# Patient Record
Sex: MALE | Race: BLACK OR AFRICAN AMERICAN | NOT HISPANIC OR LATINO | Employment: UNEMPLOYED | URBAN - METROPOLITAN AREA
[De-identification: names, ages, dates, MRNs, and addresses within clinical notes are randomized per-mention and may not be internally consistent; named-entity substitution may affect disease eponyms.]

---

## 2024-02-04 ENCOUNTER — HOSPITAL ENCOUNTER (EMERGENCY)
Facility: CLINIC | Age: 19
Discharge: HOME | End: 2024-02-04
Attending: EMERGENCY MEDICINE | Admitting: EMERGENCY MEDICINE
Payer: COMMERCIAL

## 2024-02-04 ENCOUNTER — APPOINTMENT (OUTPATIENT)
Dept: GENERAL RADIOLOGY | Facility: CLINIC | Age: 19
End: 2024-02-04
Attending: EMERGENCY MEDICINE
Payer: COMMERCIAL

## 2024-02-04 VITALS
RESPIRATION RATE: 20 BRPM | TEMPERATURE: 97.8 F | OXYGEN SATURATION: 95 % | SYSTOLIC BLOOD PRESSURE: 153 MMHG | DIASTOLIC BLOOD PRESSURE: 68 MMHG | HEART RATE: 72 BPM

## 2024-02-04 DIAGNOSIS — S93.401A SPRAIN OF RIGHT ANKLE, UNSPECIFIED LIGAMENT, INITIAL ENCOUNTER: ICD-10-CM

## 2024-02-04 PROCEDURE — 250N000013 HC RX MED GY IP 250 OP 250 PS 637: Performed by: EMERGENCY MEDICINE

## 2024-02-04 PROCEDURE — 99283 EMERGENCY DEPT VISIT LOW MDM: CPT

## 2024-02-04 PROCEDURE — 73610 X-RAY EXAM OF ANKLE: CPT | Mod: RT

## 2024-02-04 RX ORDER — IBUPROFEN 600 MG/1
600 TABLET, FILM COATED ORAL ONCE
Status: COMPLETED | OUTPATIENT
Start: 2024-02-04 | End: 2024-02-04

## 2024-02-04 RX ADMIN — IBUPROFEN 600 MG: 600 TABLET, FILM COATED ORAL at 01:20

## 2024-02-04 NOTE — DISCHARGE INSTRUCTIONS
Take ibuprofen 600 mg 3x per day as needed.  This will provide both pain control and fight against inflammation.    Discharge Instructions  Ankle Sprain    An ankle sprain is a stretching or tearing of a ligament around your ankle joint. In most cases, we recommend resting the ankle for about 3 days, followed by return to activity. Some severe sprains need longer periods of rest, or can require a cast or boot to immobilize them.    Generally, every Emergency Department visit should have a follow-up clinic visit with either a primary or a specialty clinic/provider. Please follow-up as instructed by your emergency provider today.    Return to the Emergency Department if:  Your pain is much worse, or if there is pain in a new area.  Your foot or leg becomes pale, cool, blue, or numb or tingling.  There is anything concerning to you about how your ankle looks.  Any splint or device is feeling too tight, causing pain, or rubbing into your skin.    Follow-up with your provider:  As recommended by your emergency provider.  If your ankle is not back to normal within about 1 week.  If you are involved in significant athletic activities.        Treatment:  Apply ice your injured area for 15 minutes at a time, at least 3 times a day for the first 1-2 days. Use a cloth between the ice bag and your skin to prevent frostbite.   Do not sleep with an ice pack or heating pad on, since this can cause burns or skin injury.  Raise the injured area above the level of your heart as much as possible in the first 1-2 days.  Pain medications -- You may take a pain medication such as Tylenol  (acetaminophen), Advil , Nuprin  (ibuprofen) or Aleve  (naproxen).  Splint. We often give a stirrup-shaped ankle splint to support your ankle and prevent it from turning again. Wear this all the time for the first 3-5 days, and then as directed by your provider.  Crutches. If you cannot put weight on the ankle without a lot of pain, we recommend  crutches. You can put as much weight on the ankle as possible without severe pain.   Compression. An elastic bandage (Ace  wrap) can help with pain and swelling. Remove this at least twice a day, and leave it off for several hours if you develop swelling of the foot.   Exercises.  Movements, like rotating the foot in circles, should be started when swelling improves.   If you were given a prescription for medicine here today, be sure to read all of the information (including the package insert) that comes with your prescription.  This will include important information about the medicine, its side effects, and any warnings that you need to know about.  The pharmacist who fills the prescription can provide more information and answer questions you may have about the medicine.  If you have questions or concerns that the pharmacist cannot address, please call or return to the Emergency Department.  Remember that you can always come back to the Emergency Department if you are not able to see your regular provider in the amount of time listed above, if you get any new symptoms, or if there is anything that worries you.

## 2024-02-04 NOTE — ED PROVIDER NOTES
History     Chief Complaint:  Ankle Pain       HPI   Debbie Reich is a 19 year old male who presents with ankle pain. Patient rolled his right ankle at 2000 yesterday while playing basketball.  Since the injury he has been having trouble putting weight on it. He denies any knee pain. Pain and swelling noted to the lateral ankle.    Review of External notes      Medications:    The patient is not taking any routine medications     Past Medical History:    None     Past Surgical History:    None      Physical Exam   Patient Vitals for the past 24 hrs:   BP Temp Temp src Pulse Resp SpO2   02/04/24 0106 (!) 162/76 97.8  F (36.6  C) Temporal 88 20 98 %        Physical Exam  Right Lower Extremity:  swelling at the ankle, no foot swelling, + tenderness over the ATFL, + tenderness over the CFL, no tenderness over the PTFL, no tenderness of the deltoid ligament, tenderness over the distal 6cm of the lateral malleolus, no tenderness over the distal 6cm of the medial malleolus, no tenderness at the base of the 5th metatarsal, no tenderness of the midfoot, no tenderness over the achilles tendon, no palpable achilles tendon defect     Right Knee: No tenderness, full AROM  Skin: no rash or redness RLE  Neuro: sensation intact over the dorsal and plantar surface of the foot  CV: 2+ DP and PT pulses present in Right lower extremity    Emergency Department Course   ECG:  No results found for this or any previous visit.    Imaging:  Ankle XR, G/E 3 views, right    (Results Pending)        Laboratory:  Labs Ordered and Resulted from Time of ED Arrival to Time of ED Departure - No data to display     ED Course as of 02/04/24 0140   Sun Feb 04, 2024   0112 I examined the patient and obtained history as noted above    0132 Independent or potation right ankle x-ray-no fracture     Interventions:  Medications   ibuprofen (ADVIL/MOTRIN) tablet 600 mg (has no administration in time range)        Impression & Plan    Independent  Interpretation:    See ED course    Medical Decision Making:  Debbie Reich is a 19 year old male who presents for evaluation of ankle pain.  Signs and symptoms are consistent with an ankle sprain.  This involves 2/3 ligaments of the lateral ankle by clinical exam. No signs of septic arthritis, gout, pseudogout, fracture, cellulitis, etc.  There are no signs of fracture.  The patients neurovascular status is normal. Plan is for protected weightbearing with ace wrap and gel splint.  Ice and Ibuprofen prn for pain.  Patient will advance weightbearing as tolerated and follow-up with sports med. They will begin gentle ROM exercises of the ankle including PF,DF, alphabet exercises.       Disposition:  Discharge    Diagnosis:    ICD-10-CM    1. Sprain of right ankle, unspecified ligament, initial encounter  S93.401A Orthopedic  Referral           Discharge Medications:  New Prescriptions    No medications on file      MD Lisa Arguelles Kristi Jo Schneider, MD  02/04/24 0142

## 2024-04-01 ENCOUNTER — TRANSCRIBE ORDERS (OUTPATIENT)
Dept: OTHER | Age: 19
End: 2024-04-01

## 2024-04-01 DIAGNOSIS — L70.0 ACNE VULGARIS: Primary | ICD-10-CM

## 2024-10-01 ENCOUNTER — OFFICE VISIT (OUTPATIENT)
Dept: DERMATOLOGY | Facility: CLINIC | Age: 19
End: 2024-10-01
Attending: PEDIATRICS
Payer: COMMERCIAL

## 2024-10-01 DIAGNOSIS — L70.0 ACNE VULGARIS: Primary | ICD-10-CM

## 2024-10-01 PROCEDURE — 99203 OFFICE O/P NEW LOW 30 MIN: CPT | Mod: GC | Performed by: DERMATOLOGY

## 2024-10-01 RX ORDER — DOXYCYCLINE 100 MG/1
100 CAPSULE ORAL 2 TIMES DAILY
Qty: 60 CAPSULE | Refills: 5 | Status: SHIPPED | OUTPATIENT
Start: 2024-10-01

## 2024-10-01 RX ORDER — CLINDAMYCIN PHOSPHATE 10 UG/ML
LOTION TOPICAL 2 TIMES DAILY
Qty: 60 ML | Refills: 3 | Status: SHIPPED | OUTPATIENT
Start: 2024-10-01

## 2024-10-01 RX ORDER — DOXYCYCLINE HYCLATE 50 MG/1
50 CAPSULE ORAL 2 TIMES DAILY
COMMUNITY

## 2024-10-01 RX ORDER — TRETINOIN 0.25 MG/G
CREAM TOPICAL AT BEDTIME
Qty: 45 G | Refills: 3 | Status: SHIPPED | OUTPATIENT
Start: 2024-10-01

## 2024-10-01 ASSESSMENT — PAIN SCALES - GENERAL: PAINLEVEL: NO PAIN (0)

## 2024-10-01 NOTE — PROGRESS NOTES
[facility] Dermatology Note  Encounter Date: Oct 1, 2024  Office Visit     Dermatology Problem List:  1. Acne vulgaris  - Few comedones to many inflamed papules, pustules, and nodules on face  - Current tx: Adapalene and Doxy 50 mg BID    ____________________________________________    Assessment & Plan:     # Acne vulgaris, moderate to severe.  -Patient's skin care routine should be reviewed. Discussed using gentle cleanser. Discouraged use of scrubs, brushes, astringents, and other potentially abrasive cleansing products. Encourages sunscreen of 30 spf maureen while being treated.  Reiterated consistent, regular care over months to see improvements.  - Start doxycycline 100 mg twice daily  - Start tretinoin 0.25% nightly 3x's/weekly  - BPO wash daily   - Apply clindamycin 1% lotion daily       Procedures Performed:   None    Follow-up: 4 month(s) in-person with PA, or earlier for new or changing lesions    Staff and Resident:     Dr. Cb Daniels  PGY2, Dermatology resident   I, Lis Vivar MD, saw this patient with the resident and agree with the resident s findings and plan of care as documented in the resident s note.    ____________________________________________    CC: Acne (Here today for acne concerns. )    HPI:  Mr. Debbie Reich is a(n) 19 year old male who presents today as a new patient for acne. Patient notices that the acne comes and goes. He has acne on his face and on his back. Treatment hs has had in the past in Doxycycline and took it only during flares as instructed by his PCP. Reports the longest time that he's been on the Doxy as for 6-months. Patient also used Adapalene and applied it every other day. He states that while on the Doxy, he noticed that the breakouts was less frequents. His skin can regimen includes Cerave cleanser, sunscreen sometimes when outdoors.     He has no other dermatologic history. Patient is otherwise feeling well, without  additional skin concerns.    Labs Reviewed:  N/A    Physical Exam:  Vitals: There were no vitals taken for this visit.  SKIN: Acne exam, which includes the face, neck, upper central chest, and upper central back was performed.  - There are superifical acneiform papules with intermixed open and closed comedones on the fore head, cheek, and chin  and Acneiform scarring is noted on the upper back.. Few nodules localized on the nose.   - No other lesions of concern on areas examined.     Medications:  Current Outpatient Medications   Medication Sig Dispense Refill    doxycycline hyclate (VIBRAMYCIN) 50 MG capsule Take 50 mg by mouth 2 times daily. (Patient not taking: Reported on 10/1/2024)       No current facility-administered medications for this visit.      Past Medical History:   There is no problem list on file for this patient.    No past medical history on file.    CC Aletha Rogers MD  [address] [place] [address] on close of this encounter.

## 2024-10-01 NOTE — LETTER
10/1/2024       RE: Debbie Reich  [address] [address] [place] [address]     Dear Colleague,    Thank you for referring your patient, Debbie Reich, to the [facility] [place] at [facility]. Please see a copy of my visit note below.    [facility] Dermatology Note  Encounter Date: Oct 1, 2024  Office Visit     Dermatology Problem List:  1. Acne vulgaris  - Few comedones to many inflamed papules, pustules, and nodules on face  - Current tx: Adapalene and Doxy 50 mg BID    ____________________________________________    Assessment & Plan:     # Acne vulgaris, moderate to severe.  -Patient's skin care routine should be reviewed. Discussed using gentle cleanser. Discouraged use of scrubs, brushes, astringents, and other potentially abrasive cleansing products. Encourages sunscreen of 30 spf maureen while being treated.  Reiterated consistent, regular care over months to see improvements.  - Start doxycycline 100 mg twice daily  - Start tretinoin 0.25% nightly 3x's/weekly  - BPO wash daily   - Apply clindamycin 1% lotion daily       Procedures Performed:   None    Follow-up: 4 month(s) in-person with PA, or earlier for new or changing lesions    Staff and Resident:     Dr. Cb Daniels  PGY2, Dermatology resident   I, Lis Vivar MD, saw this patient with the resident and agree with the resident s findings and plan of care as documented in the resident s note.    ____________________________________________    CC: Acne (Here today for acne concerns. )    HPI:  Mr. Debbie Reich is a(n) 19 year old male who presents today as a new patient for acne. Patient notices that the acne comes and goes. He has acne on his face and on his back. Treatment hs has had in the past in Doxycycline and took it only during flares as instructed by his PCP. Reports the longest time that he's been on the Doxy as for 6-months.  Patient also used Adapalene and applied it every other day. He states that while on the Doxy, he noticed that the breakouts was less frequents. His skin can regimen includes Cerave cleanser, sunscreen sometimes when outdoors.     He has no other dermatologic history. Patient is otherwise feeling well, without additional skin concerns.    Labs Reviewed:  N/A    Physical Exam:  Vitals: There were no vitals taken for this visit.  SKIN: Acne exam, which includes the face, neck, upper central chest, and upper central back was performed.  - There are superifical acneiform papules with intermixed open and closed comedones on the fore head, cheek, and chin  and Acneiform scarring is noted on the upper back.. Few nodules localized on the nose.   - No other lesions of concern on areas examined.     Medications:  Current Outpatient Medications   Medication Sig Dispense Refill     doxycycline hyclate (VIBRAMYCIN) 50 MG capsule Take 50 mg by mouth 2 times daily. (Patient not taking: Reported on 10/1/2024)       No current facility-administered medications for this visit.      Past Medical History:   There is no problem list on file for this patient.    No past medical history on file.    CC Aletha Rogers MD  [address] [place] [address] on close of this encounter.       Again, thank you for allowing me to participate in the care of your patient.      Sincerely,    Lis Vivar MD

## 2024-10-01 NOTE — NURSING NOTE
Dermatology Rooming Note    Debbie Reich's goals for this visit include:   Chief Complaint   Patient presents with    Acne     Here today for acne concerns.      Joan Pope RN

## 2024-10-09 ENCOUNTER — TELEPHONE (OUTPATIENT)
Dept: DERMATOLOGY | Facility: CLINIC | Age: 19
End: 2024-10-09
Payer: COMMERCIAL

## 2024-10-09 NOTE — TELEPHONE ENCOUNTER
10/9 Sent R[employer]Talk[employer] (1st Attempt) for the patient to call back and schedule the following:    Appointment type: Return Dermatology  Provider: Maida  Return date: 1/3/2025 can use a SUMA SLOT   Specialty phone number: [phone]  Additional appointment(s) needed: n/a  Additonal Notes: n/a

## 2024-10-15 ENCOUNTER — TELEPHONE (OUTPATIENT)
Dept: DERMATOLOGY | Facility: CLINIC | Age: 19
End: 2024-10-15
Payer: COMMERCIAL

## 2024-10-15 NOTE — TELEPHONE ENCOUNTER
10/15 Left Voicemail (2nd Attempt) and Sent Mychart (2nd Attempt) for the patient to call back and schedule the following:    Appointment type: Return Dermatology  Provider: Maida  Return date: 1/3/2025  Specialty phone number: [phone]  Additional appointment(s) needed: n/a  Additonal Notes: n/a

## 2024-12-15 ENCOUNTER — HEALTH MAINTENANCE LETTER (OUTPATIENT)
Age: 19
End: 2024-12-15